# Patient Record
Sex: FEMALE | Race: WHITE | NOT HISPANIC OR LATINO | Employment: FULL TIME | URBAN - METROPOLITAN AREA
[De-identification: names, ages, dates, MRNs, and addresses within clinical notes are randomized per-mention and may not be internally consistent; named-entity substitution may affect disease eponyms.]

---

## 2021-03-17 ENCOUNTER — HOSPITAL ENCOUNTER (EMERGENCY)
Facility: HOSPITAL | Age: 36
Discharge: HOME | End: 2021-03-17
Attending: EMERGENCY MEDICINE | Admitting: EMERGENCY MEDICINE
Payer: COMMERCIAL

## 2021-03-17 ENCOUNTER — APPOINTMENT (EMERGENCY)
Dept: RADIOLOGY | Facility: HOSPITAL | Age: 36
End: 2021-03-17
Payer: COMMERCIAL

## 2021-03-17 VITALS
SYSTOLIC BLOOD PRESSURE: 190 MMHG | BODY MASS INDEX: 44.41 KG/M2 | WEIGHT: 293 LBS | TEMPERATURE: 97.9 F | RESPIRATION RATE: 15 BRPM | OXYGEN SATURATION: 95 % | HEART RATE: 98 BPM | HEIGHT: 68 IN | DIASTOLIC BLOOD PRESSURE: 90 MMHG

## 2021-03-17 DIAGNOSIS — M54.2 NECK PAIN: Primary | ICD-10-CM

## 2021-03-17 PROCEDURE — 99283 EMERGENCY DEPT VISIT LOW MDM: CPT

## 2021-03-17 PROCEDURE — 99284 EMERGENCY DEPT VISIT MOD MDM: CPT | Performed by: EMERGENCY MEDICINE

## 2021-03-17 PROCEDURE — 72050 X-RAY EXAM NECK SPINE 4/5VWS: CPT

## 2021-03-17 RX ORDER — CYCLOBENZAPRINE HCL 10 MG
10 TABLET ORAL ONCE
Status: COMPLETED | OUTPATIENT
Start: 2021-03-17 | End: 2021-03-17

## 2021-03-17 RX ORDER — CYCLOBENZAPRINE HCL 10 MG
10 TABLET ORAL 2 TIMES DAILY PRN
Qty: 8 TABLET | Refills: 0 | Status: SHIPPED | OUTPATIENT
Start: 2021-03-17

## 2021-03-17 RX ADMIN — CYCLOBENZAPRINE HYDROCHLORIDE 10 MG: 10 TABLET, FILM COATED ORAL at 18:46

## 2021-03-17 NOTE — ED PROVIDER NOTES
History  Chief Complaint   Patient presents with    Neck Pain     pt reports she was moving a heavy object when she suddenly felt a "pop" in her neck and is now having pain  This is a 65-year-old female who complains of neck pain over the C4 vertebrae  She sees a a is sore recently but significantly worsened at approximately 1:00 a m  today when making in abnormal jerking motion because of her dog  Pain is not radiating  Denies any numbness weakness or change in bowel or bladder habits  Denies any tuberculosis, cancer IV drug use  No unexplained night sweats or steroid use  Rates the pain as severe, worse with movement  Nothing is making it better  None       History reviewed  No pertinent past medical history  History reviewed  No pertinent surgical history  History reviewed  No pertinent family history  I have reviewed and agree with the history as documented  E-Cigarette/Vaping    E-Cigarette Use Never User      E-Cigarette/Vaping Substances     Social History     Tobacco Use    Smoking status: Never Smoker    Smokeless tobacco: Never Used   Substance Use Topics    Alcohol use: Never     Frequency: Never    Drug use: Never       Review of Systems   Constitutional: Negative for activity change, chills, fatigue and fever  HENT: Negative for congestion  Eyes: Negative for visual disturbance  Respiratory: Negative for cough, chest tightness and shortness of breath  Cardiovascular: Negative for chest pain  Gastrointestinal: Negative for abdominal pain, diarrhea and vomiting  Genitourinary: Negative for dysuria  Skin: Negative for rash  Neurological: Negative for dizziness, weakness and numbness  Physical Exam  Physical Exam  Constitutional:       Appearance: She is well-developed  Comments: Appears uncomfortable   HENT:      Head: Normocephalic and atraumatic        Right Ear: External ear normal       Left Ear: External ear normal    Eyes: Conjunctiva/sclera: Conjunctivae normal       Pupils: Pupils are equal, round, and reactive to light  Neck:      Musculoskeletal: Normal range of motion and neck supple  Cardiovascular:      Rate and Rhythm: Normal rate and regular rhythm  Heart sounds: Normal heart sounds  Pulmonary:      Effort: Pulmonary effort is normal  No respiratory distress  Breath sounds: Normal breath sounds  Abdominal:      General: Bowel sounds are normal  There is no distension  Palpations: Abdomen is soft  Tenderness: There is no abdominal tenderness  There is no guarding or rebound  Musculoskeletal: Normal range of motion  Skin:     General: Skin is warm and dry  Capillary Refill: Capillary refill takes less than 2 seconds  Neurological:      General: No focal deficit present  Mental Status: She is alert and oriented to person, place, and time  Cranial Nerves: No cranial nerve deficit  Sensory: No sensory deficit  Motor: No weakness        Coordination: Coordination normal       Gait: Gait normal    Psychiatric:         Behavior: Behavior normal          Vital Signs  ED Triage Vitals [03/17/21 1756]   Temperature Pulse Respirations Blood Pressure SpO2   97 9 °F (36 6 °C) 98 15 (!) 190/90 95 %      Temp Source Heart Rate Source Patient Position - Orthostatic VS BP Location FiO2 (%)   Temporal Monitor -- Right arm --      Pain Score       8           Vitals:    03/17/21 1756   BP: (!) 190/90   Pulse: 98         Visual Acuity      ED Medications  Medications   cyclobenzaprine (FLEXERIL) tablet 10 mg (10 mg Oral Given 3/17/21 1846)       Diagnostic Studies  Results Reviewed     None                 XR spine cervical complete 4 or 5 vw non injury   ED Interpretation by Paul Anne MD (03/17 1840)   naf                 Procedures  Procedures         ED Course                                           MDM  Number of Diagnoses or Management Options  Neck pain: new and requires workup  Diagnosis management comments: This is a 82-year-old female neck pain  Reassured by lack of neurological non abnormalities  Patient is tuna fish negative for warning signs  Patient started on Flexeril in the emergency department given a script to go home with  Discussed warning signs and symptoms with the patient as well as when to return to the emergency department versus follow up with [facility]  Patient states understanding and agreement with the plan  This note was completed using dictation software  Amount and/or Complexity of Data Reviewed  Tests in the radiology section of CPT®: ordered and reviewed  Independent visualization of images, tracings, or specimens: yes        Disposition  Final diagnoses:   Neck pain     Time reflects when diagnosis was documented in both MDM as applicable and the Disposition within this note     Time User Action Codes Description Comment    3/17/2021  6:40 PM Emerita Keene Add [M54 2] Neck pain       ED Disposition     ED Disposition Condition Date/Time Comment    Discharge Stable Wed Mar 17, 2021  6:40 PM Sofi Demoor discharge to home/self care  Follow-up Information     Follow up With Specialties Details Why Contact Info    King Sharma MD Internal Medicine In 1 day  [address] [place] [address] [id number] [phone]            Patient's Medications   Discharge Prescriptions    CYCLOBENZAPRINE (FLEXERIL) 10 MG TABLET    Take 1 tablet (10 mg total) by mouth 2 (two) times a day as needed for muscle spasms       Start Date: 3/17/2021 End Date: --       Order Dose: 10 mg       Quantity: 8 tablet    Refills: 0     No discharge procedures on file      PDMP Review     None          ED Provider  Electronically Signed by           Brando Pantoja MD  03/17/21 7497

## 2021-03-17 NOTE — Clinical Note
Sofi Roach was seen and treated in our emergency department on 3/17/2021  Diagnosis:     Sofi    She may return on this date: 03/22/2021         If you have any questions or concerns, please don't hesitate to call        Bandar Chiang MD    ______________________________           _______________          _______________  Hospital Representative                              Date                                Time

## 2022-08-05 ENCOUNTER — APPOINTMENT (OUTPATIENT)
Dept: LAB | Facility: CLINIC | Age: 37
End: 2022-08-05
Payer: COMMERCIAL

## 2022-08-05 DIAGNOSIS — Z13.0 SCREENING FOR IRON DEFICIENCY ANEMIA: ICD-10-CM

## 2022-08-05 DIAGNOSIS — Z11.59 SCREENING FOR RUBELLA: ICD-10-CM

## 2022-08-05 DIAGNOSIS — Z13.29 THYROID DISORDER SCREEN: ICD-10-CM

## 2022-08-05 DIAGNOSIS — Z01.83 ENCOUNTER FOR BLOOD TYPING: ICD-10-CM

## 2022-08-05 DIAGNOSIS — E28.2 POLYCYSTIC OVARIES: ICD-10-CM

## 2022-08-05 DIAGNOSIS — Z11.59 NEED FOR HEPATITIS C SCREENING TEST: ICD-10-CM

## 2022-08-05 DIAGNOSIS — Z11.3 SCREENING FOR STDS (SEXUALLY TRANSMITTED DISEASES): ICD-10-CM

## 2022-08-05 DIAGNOSIS — Z31.41 FERTILITY TESTING: ICD-10-CM

## 2022-08-05 DIAGNOSIS — Z11.59 SCREENING FOR VIRAL DISEASE: ICD-10-CM

## 2022-08-05 DIAGNOSIS — Z11.59 NEED FOR HEPATITIS B SCREENING TEST: ICD-10-CM

## 2022-08-05 DIAGNOSIS — Z11.4 SCREENING FOR HIV (HUMAN IMMUNODEFICIENCY VIRUS): ICD-10-CM

## 2022-08-05 LAB
ABO GROUP BLD: NORMAL
ALBUMIN SERPL BCP-MCNC: 3.1 G/DL (ref 3.5–5)
ALP SERPL-CCNC: 93 U/L (ref 46–116)
ALT SERPL W P-5'-P-CCNC: 37 U/L (ref 12–78)
ANION GAP SERPL CALCULATED.3IONS-SCNC: 6 MMOL/L (ref 4–13)
AST SERPL W P-5'-P-CCNC: 22 U/L (ref 5–45)
BILIRUB SERPL-MCNC: 0.25 MG/DL (ref 0.2–1)
BLD GP AB SCN SERPL QL: NEGATIVE
BUN SERPL-MCNC: 12 MG/DL (ref 5–25)
CALCIUM ALBUM COR SERPL-MCNC: 10.9 MG/DL (ref 8.3–10.1)
CALCIUM SERPL-MCNC: 10.2 MG/DL (ref 8.3–10.1)
CHLORIDE SERPL-SCNC: 105 MMOL/L (ref 96–108)
CHOLEST SERPL-MCNC: 213 MG/DL
CO2 SERPL-SCNC: 26 MMOL/L (ref 21–32)
CREAT SERPL-MCNC: 0.69 MG/DL (ref 0.6–1.3)
ERYTHROCYTE [DISTWIDTH] IN BLOOD BY AUTOMATED COUNT: 13.2 % (ref 11.6–15.1)
EST. AVERAGE GLUCOSE BLD GHB EST-MCNC: 120 MG/DL
GFR SERPL CREATININE-BSD FRML MDRD: 113 ML/MIN/1.73SQ M
GLUCOSE P FAST SERPL-MCNC: 107 MG/DL (ref 65–99)
HBA1C MFR BLD: 5.8 %
HBV SURFACE AG SER QL: NORMAL
HCT VFR BLD AUTO: 36.1 % (ref 34.8–46.1)
HDLC SERPL-MCNC: 39 MG/DL
HGB BLD-MCNC: 11.5 G/DL (ref 11.5–15.4)
INSULIN SERPL-ACNC: 27.2 MU/L (ref 3–25)
LDLC SERPL CALC-MCNC: 123 MG/DL (ref 0–100)
MCH RBC QN AUTO: 27.2 PG (ref 26.8–34.3)
MCHC RBC AUTO-ENTMCNC: 31.9 G/DL (ref 31.4–37.4)
MCV RBC AUTO: 85 FL (ref 82–98)
NONHDLC SERPL-MCNC: 174 MG/DL
PLATELET # BLD AUTO: 292 THOUSANDS/UL (ref 149–390)
PMV BLD AUTO: 10.1 FL (ref 8.9–12.7)
POTASSIUM SERPL-SCNC: 4.2 MMOL/L (ref 3.5–5.3)
PROT SERPL-MCNC: 7.4 G/DL (ref 6.4–8.4)
RBC # BLD AUTO: 4.23 MILLION/UL (ref 3.81–5.12)
RH BLD: POSITIVE
RUBV IGG SERPL IA-ACNC: 37 IU/ML
SODIUM SERPL-SCNC: 137 MMOL/L (ref 135–147)
TRIGL SERPL-MCNC: 256 MG/DL
TSH SERPL DL<=0.05 MIU/L-ACNC: 3.45 UIU/ML (ref 0.45–4.5)
WBC # BLD AUTO: 11.83 THOUSAND/UL (ref 4.31–10.16)

## 2022-08-05 PROCEDURE — 82627 DEHYDROEPIANDROSTERONE: CPT

## 2022-08-05 PROCEDURE — 83525 ASSAY OF INSULIN: CPT

## 2022-08-05 PROCEDURE — 86762 RUBELLA ANTIBODY: CPT

## 2022-08-05 PROCEDURE — 86901 BLOOD TYPING SEROLOGIC RH(D): CPT

## 2022-08-05 PROCEDURE — 84402 ASSAY OF FREE TESTOSTERONE: CPT

## 2022-08-05 PROCEDURE — 85027 COMPLETE CBC AUTOMATED: CPT

## 2022-08-05 PROCEDURE — 36415 COLL VENOUS BLD VENIPUNCTURE: CPT

## 2022-08-05 PROCEDURE — 80053 COMPREHEN METABOLIC PANEL: CPT

## 2022-08-05 PROCEDURE — 83498 ASY HYDROXYPROGESTERONE 17-D: CPT

## 2022-08-05 PROCEDURE — 86787 VARICELLA-ZOSTER ANTIBODY: CPT

## 2022-08-05 PROCEDURE — 86803 HEPATITIS C AB TEST: CPT

## 2022-08-05 PROCEDURE — 80061 LIPID PANEL: CPT

## 2022-08-05 PROCEDURE — 86592 SYPHILIS TEST NON-TREP QUAL: CPT

## 2022-08-05 PROCEDURE — 87389 HIV-1 AG W/HIV-1&-2 AB AG IA: CPT

## 2022-08-05 PROCEDURE — 82397 CHEMILUMINESCENT ASSAY: CPT

## 2022-08-05 PROCEDURE — 87491 CHLMYD TRACH DNA AMP PROBE: CPT

## 2022-08-05 PROCEDURE — 83036 HEMOGLOBIN GLYCOSYLATED A1C: CPT

## 2022-08-05 PROCEDURE — 87591 N.GONORRHOEAE DNA AMP PROB: CPT

## 2022-08-05 PROCEDURE — 86850 RBC ANTIBODY SCREEN: CPT

## 2022-08-05 PROCEDURE — 86900 BLOOD TYPING SEROLOGIC ABO: CPT

## 2022-08-05 PROCEDURE — 84443 ASSAY THYROID STIM HORMONE: CPT

## 2022-08-05 PROCEDURE — 84403 ASSAY OF TOTAL TESTOSTERONE: CPT

## 2022-08-05 PROCEDURE — 87340 HEPATITIS B SURFACE AG IA: CPT

## 2022-08-06 LAB
C TRACH DNA SPEC QL NAA+PROBE: NEGATIVE
DHEA-S SERPL-MCNC: 162 UG/DL (ref 84.8–378)
HCV AB S/CO SERPL IA: <0.1 S/CO RATIO (ref 0–0.9)
HIV 1+2 AB+HIV1 P24 AG SERPL QL IA: NORMAL
N GONORRHOEA DNA SPEC QL NAA+PROBE: NEGATIVE
RPR SER QL: NORMAL
SL AMB INTERPRETATION: NORMAL
TESTOST FREE SERPL-MCNC: 2.9 PG/ML (ref 0–4.2)
TESTOST SERPL-MCNC: 37 NG/DL (ref 8–60)
VZV IGG SER QL IA: NORMAL

## 2022-08-08 LAB — 17OHP SERPL-MCNC: 120 NG/DL

## 2022-08-11 LAB — MIS SERPL-MCNC: 3.57 NG/ML

## 2023-11-01 ENCOUNTER — OFFICE VISIT (OUTPATIENT)
Dept: URGENT CARE | Facility: CLINIC | Age: 38
End: 2023-11-01
Payer: COMMERCIAL

## 2023-11-01 ENCOUNTER — HOSPITAL ENCOUNTER (EMERGENCY)
Facility: HOSPITAL | Age: 38
Discharge: HOME | End: 2023-11-01
Attending: EMERGENCY MEDICINE
Payer: COMMERCIAL

## 2023-11-01 VITALS
HEART RATE: 66 BPM | DIASTOLIC BLOOD PRESSURE: 90 MMHG | OXYGEN SATURATION: 99 % | SYSTOLIC BLOOD PRESSURE: 178 MMHG | RESPIRATION RATE: 20 BRPM | TEMPERATURE: 98.1 F

## 2023-11-01 VITALS
SYSTOLIC BLOOD PRESSURE: 161 MMHG | RESPIRATION RATE: 18 BRPM | HEART RATE: 71 BPM | DIASTOLIC BLOOD PRESSURE: 87 MMHG | TEMPERATURE: 97.9 F | OXYGEN SATURATION: 99 %

## 2023-11-01 DIAGNOSIS — Z77.098 CHEMICAL EXPOSURE: Primary | ICD-10-CM

## 2023-11-01 DIAGNOSIS — Z77.098 EXPOSURE TO TOXIC CHEMICAL: Primary | ICD-10-CM

## 2023-11-01 DIAGNOSIS — L24.9 IRRITANT DERMATITIS: ICD-10-CM

## 2023-11-01 PROCEDURE — 99213 OFFICE O/P EST LOW 20 MIN: CPT | Performed by: NURSE PRACTITIONER

## 2023-11-01 PROCEDURE — 99282 EMERGENCY DEPT VISIT SF MDM: CPT

## 2023-11-01 PROCEDURE — 99284 EMERGENCY DEPT VISIT MOD MDM: CPT | Performed by: EMERGENCY MEDICINE

## 2023-11-01 PROCEDURE — S9088 SERVICES PROVIDED IN URGENT: HCPCS | Performed by: NURSE PRACTITIONER

## 2023-11-01 RX ORDER — LABETALOL 200 MG/1
200 TABLET, FILM COATED ORAL 3 TIMES DAILY
COMMUNITY
Start: 2023-09-04

## 2023-11-01 RX ORDER — TETRACAINE HYDROCHLORIDE 5 MG/ML
1 SOLUTION OPHTHALMIC ONCE
Status: COMPLETED | OUTPATIENT
Start: 2023-11-01 | End: 2023-11-01

## 2023-11-01 RX ORDER — CITALOPRAM HYDROBROMIDE 10 MG/1
10 TABLET ORAL DAILY
COMMUNITY
Start: 2023-10-19

## 2023-11-01 RX ADMIN — TETRACAINE HYDROCHLORIDE 1 DROP: 5 SOLUTION OPHTHALMIC at 16:42

## 2023-11-01 RX ADMIN — FLUORESCEIN SODIUM 1 STRIP: 1 STRIP OPHTHALMIC at 16:42
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
## 2024-03-07 ENCOUNTER — OFFICE VISIT (OUTPATIENT)
Dept: URGENT CARE | Facility: CLINIC | Age: 39
End: 2024-03-07
Payer: COMMERCIAL

## 2024-03-07 VITALS
DIASTOLIC BLOOD PRESSURE: 83 MMHG | SYSTOLIC BLOOD PRESSURE: 135 MMHG | TEMPERATURE: 97.9 F | HEART RATE: 83 BPM | RESPIRATION RATE: 20 BRPM | OXYGEN SATURATION: 93 %

## 2024-03-07 DIAGNOSIS — H92.01 ACUTE OTALGIA, RIGHT: ICD-10-CM

## 2024-03-07 DIAGNOSIS — J02.9 SORE THROAT: Primary | ICD-10-CM

## 2024-03-07 DIAGNOSIS — J06.9 VIRAL UPPER RESPIRATORY TRACT INFECTION WITH COUGH: ICD-10-CM

## 2024-03-07 LAB — S PYO AG THROAT QL: NEGATIVE

## 2024-03-07 PROCEDURE — 99214 OFFICE O/P EST MOD 30 MIN: CPT | Performed by: NURSE PRACTITIONER

## 2024-03-07 PROCEDURE — 87070 CULTURE OTHR SPECIMN AEROBIC: CPT | Performed by: NURSE PRACTITIONER

## 2024-03-07 PROCEDURE — S9088 SERVICES PROVIDED IN URGENT: HCPCS | Performed by: NURSE PRACTITIONER

## 2024-03-07 PROCEDURE — 87880 STREP A ASSAY W/OPTIC: CPT | Performed by: NURSE PRACTITIONER

## 2024-03-07 RX ORDER — FEXOFENADINE HCL 180 MG/1
180 TABLET ORAL
COMMUNITY
Start: 2024-02-21

## 2024-03-07 RX ORDER — MOMETASONE FUROATE 1 MG/G
OINTMENT TOPICAL DAILY
COMMUNITY
Start: 2024-02-21

## 2024-03-07 RX ORDER — BENZONATATE 100 MG/1
100 CAPSULE ORAL
COMMUNITY
Start: 2024-03-05

## 2024-03-07 RX ORDER — LOSARTAN POTASSIUM 25 MG/1
25 TABLET ORAL DAILY
COMMUNITY
Start: 2024-01-30 | End: 2024-07-28

## 2024-03-07 RX ORDER — PREDNISONE 10 MG/1
TABLET ORAL
Qty: 24 TABLET | Refills: 0 | Status: SHIPPED | OUTPATIENT
Start: 2024-03-07

## 2024-03-07 NOTE — PROGRESS NOTES
[facility]        NAME: Sofi Roach is a 38 y.o. female  : 1985    MRN: [id number]  DATE: 2024  TIME: 1:26 PM    Assessment and Plan   Sore throat [J02.9]  1. Sore throat  POCT rapid strepA    predniSONE 10 mg tablet    Throat culture      2. Viral upper respiratory tract infection with cough  predniSONE 10 mg tablet      3. Acute otalgia, right  predniSONE 10 mg tablet            Patient Instructions       Follow up with PCP in 3-5 days.  Proceed to  ER if symptoms worsen.    If tests have been performed at [facility], our office will contact you with results if changes need to be made to the care plan discussed with you at the visit.  You can review your full results on [facility].      Your strep A is negative. You have a throat culture pending. You are to download [facility] mychart for the results in 3-4 days.  You will be notified if the results are + and an antibiotic will be called in for you.    You are to do warm salt water gargles 4 x daily.  Drink warm tea with honey and lemon.  Take tylenol or motrin as able for pain or fever.  Chloraseptic throat spray, cough drops.  Do not share utensils.  Change your tooth brush in 3 days.  Follow up with your PCP in 2-3 days  Go to the ED if symptoms worsen      You may continue your OTC medications.  You have been prescribed prednisone for inflammation.  You have been prescribed prednisone. Take with food. Do NOT take any NSAID products (motrin, ibuprofen, aleve, advil) while taking this medication.  You may take tylenol.         Chief Complaint     Chief Complaint   Patient presents with    Earache    Sore Throat         History of Present Illness       This is a 38 year old female who states that last week she had a temp of 103, cough, sorethroat that is worsening.  She states she has been taking OTC w/o relief.  She c/o right ear pain.  She did a tele health visit 2 days ago and was prescribed benzonate for her cough.  She states she  forgot to tell them about her sorethroat.   She denies pregnancy.  PMH is listed     Earache   Associated symptoms include coughing and a sore throat.   Sore Throat   Associated symptoms include coughing and ear pain.       Review of Systems   Review of Systems   Constitutional:  Positive for fever.   HENT:  Positive for ear pain and sore throat.    Eyes: Negative.    Respiratory:  Positive for cough.    Cardiovascular: Negative.    Gastrointestinal: Negative.    Endocrine: Negative.    Genitourinary: Negative.    Musculoskeletal: Negative.    Skin: Negative.    Allergic/Immunologic: Negative.    Neurological: Negative.    Hematological: Negative.    Psychiatric/Behavioral: Negative.           Current Medications       Current Outpatient Medications:     benzonatate (TESSALON PERLES) 100 mg capsule, Take 100 mg by mouth, Disp: , Rfl:     citalopram (CeleXA) 10 mg tablet, Take 10 mg by mouth daily, Disp: , Rfl:     fexofenadine (ALLEGRA) 180 MG tablet, Take 180 mg by mouth, Disp: , Rfl:     labetalol (NORMODYNE) 200 mg tablet, Take 200 mg by mouth Three times a day, Disp: , Rfl:     losartan (COZAAR) 25 mg tablet, Take 25 mg by mouth daily, Disp: , Rfl:     metFORMIN (GLUCOPHAGE) 500 mg tablet, Take 500 mg by mouth 2 (two) times a day, Disp: , Rfl:     mometasone (ELOCON) 0.1 % ointment, Apply topically daily, Disp: , Rfl:     predniSONE 10 mg tablet, Take 5 tabs po x 2 days; 4 tabs po x 2 days; 3 tabs po x 1 day; 2 tabs po x 1 day. 1 tab po x 1 day., Disp: 24 tablet, Rfl: 0    cyclobenzaprine (FLEXERIL) 10 mg tablet, Take 1 tablet (10 mg total) by mouth 2 (two) times a day as needed for muscle spasms (Patient not taking: Reported on 11/1/2023), Disp: 8 tablet, Rfl: 0    Ferrous Sulfate (IRON PO), Take 1 tablet by mouth daily (Patient not taking: Reported on 3/7/2024), Disp: , Rfl:     Current Allergies     Allergies as of 03/07/2024 - Reviewed 03/07/2024   Allergen Reaction Noted    Cefprozil Rash 08/05/1998    Food  Anaphylaxis 2011    Sumatriptan Other (See Comments) 10/12/2005    Guaifenesin Hives 2020            The following portions of the patient's history were reviewed and updated as appropriate: allergies, current medications, past family history, past medical history, past social history, past surgical history and problem list.     Past Medical History:   Diagnosis Date    Depression     Hypertension        Past Surgical History:   Procedure Laterality Date     SECTION      ORIF FIBULA FRACTURE         History reviewed. No pertinent family history.      Medications have been verified.        Objective   /83   Pulse 83   Temp 97.9 °F (36.6 °C)   Resp 20   LMP 2024 (Exact Date)   SpO2 93%   Patient's last menstrual period was 2024 (exact date).       Physical Exam     Physical Exam  Vitals and nursing note reviewed.   Constitutional:       General: She is not in acute distress.     Appearance: She is well-developed. She is obese. She is not toxic-appearing or diaphoretic.   HENT:      Head: Normocephalic and atraumatic.      Right Ear: Tympanic membrane and ear canal normal.      Left Ear: Tympanic membrane and ear canal normal.      Nose: No congestion or rhinorrhea.      Mouth/Throat:      Mouth: Mucous membranes are moist. No oral lesions.      Pharynx: Uvula midline. No pharyngeal swelling, oropharyngeal exudate, posterior oropharyngeal erythema or uvula swelling.      Tonsils: No tonsillar exudate or tonsillar abscesses.   Eyes:      Extraocular Movements:      Right eye: Normal extraocular motion.      Left eye: Normal extraocular motion.   Cardiovascular:      Rate and Rhythm: Normal rate and regular rhythm.      Heart sounds: Normal heart sounds. No murmur heard.  Pulmonary:      Effort: Pulmonary effort is normal. No respiratory distress.      Breath sounds: Normal breath sounds. No stridor. No wheezing, rhonchi or rales.   Chest:      Chest wall: No tenderness.    Lymphadenopathy:      Cervical: No cervical adenopathy.   Skin:     General: Skin is warm and dry.      Capillary Refill: Capillary refill takes less than 2 seconds.   Neurological:      General: No focal deficit present.      Mental Status: She is alert and oriented to person, place, and time.   Psychiatric:         Mood and Affect: Mood normal.         Behavior: Behavior normal.

## 2024-03-07 NOTE — PATIENT INSTRUCTIONS
Your strep A is negative. You have a throat culture pending. You are to download [employer] for the results in 3-4 days.  You will be notified if the results are + and an antibiotic will be called in for you.    You are to do warm salt water gargles 4 x daily.  Drink warm tea with honey and lemon.  Take tylenol or motrin as able for pain or fever.  Chloraseptic throat spray, cough drops.  Do not share utensils.  Change your tooth brush in 3 days.  Follow up with your PCP in 2-3 days  Go to the ED if symptoms worsen      You may continue your OTC medications.  You have been prescribed prednisone for inflammation.  You have been prescribed prednisone. Take with food. Do NOT take any NSAID products (motrin, ibuprofen, aleve, advil) while taking this medication.  You may take tylenol.

## 2024-03-10 LAB — BACTERIA THROAT CULT: NORMAL

## 2025-02-24 ENCOUNTER — APPOINTMENT (EMERGENCY)
Dept: RADIOLOGY | Facility: HOSPITAL | Age: 40
End: 2025-02-24
Payer: COMMERCIAL

## 2025-02-24 ENCOUNTER — HOSPITAL ENCOUNTER (EMERGENCY)
Facility: HOSPITAL | Age: 40
Discharge: HOME | End: 2025-02-24
Attending: EMERGENCY MEDICINE | Admitting: EMERGENCY MEDICINE
Payer: COMMERCIAL

## 2025-02-24 VITALS
DIASTOLIC BLOOD PRESSURE: 90 MMHG | HEART RATE: 70 BPM | TEMPERATURE: 98 F | BODY MASS INDEX: 44.41 KG/M2 | RESPIRATION RATE: 16 BRPM | WEIGHT: 293 LBS | OXYGEN SATURATION: 98 % | SYSTOLIC BLOOD PRESSURE: 186 MMHG | HEIGHT: 68 IN

## 2025-02-24 DIAGNOSIS — R05.9 COUGH: Primary | ICD-10-CM

## 2025-02-24 DIAGNOSIS — R07.89 RIGHT-SIDED CHEST WALL PAIN: ICD-10-CM

## 2025-02-24 PROCEDURE — 99283 EMERGENCY DEPT VISIT LOW MDM: CPT

## 2025-02-24 PROCEDURE — 71046 X-RAY EXAM CHEST 2 VIEWS: CPT

## 2025-02-24 PROCEDURE — 99284 EMERGENCY DEPT VISIT MOD MDM: CPT | Performed by: EMERGENCY MEDICINE

## 2025-02-24 RX ORDER — OXYCODONE HYDROCHLORIDE 5 MG/1
5 TABLET ORAL EVERY 6 HOURS PRN
Qty: 12 TABLET | Refills: 0 | Status: SHIPPED | OUTPATIENT
Start: 2025-02-24 | End: 2025-03-06

## 2025-02-24 RX ORDER — PROMETHAZINE HYDROCHLORIDE 12.5 MG/1
25 TABLET ORAL EVERY 6 HOURS PRN
Qty: 24 TABLET | Refills: 0 | Status: SHIPPED | OUTPATIENT
Start: 2025-02-24

## 2025-02-24 RX ORDER — ALBUTEROL SULFATE 90 UG/1
2 INHALANT RESPIRATORY (INHALATION) EVERY 6 HOURS PRN
Qty: 8.5 G | Refills: 1 | Status: SHIPPED | OUTPATIENT
Start: 2025-02-24

## 2025-02-24 RX ORDER — DOXYCYCLINE 100 MG/1
100 CAPSULE ORAL 2 TIMES DAILY
COMMUNITY
Start: 2024-12-22

## 2025-02-24 RX ORDER — ALBUTEROL SULFATE 90 UG/1
2 INHALANT RESPIRATORY (INHALATION) EVERY 6 HOURS PRN
COMMUNITY
End: 2025-02-24

## 2025-02-24 RX ORDER — CITALOPRAM HYDROBROMIDE 20 MG/1
20 TABLET ORAL EVERY MORNING
COMMUNITY
Start: 2024-12-09

## 2025-02-24 RX ORDER — IVERMECTIN 10 MG/G
CREAM TOPICAL
COMMUNITY
Start: 2024-12-12

## 2025-02-24 RX ORDER — TRIAMCINOLONE ACETONIDE 1 MG/G
CREAM TOPICAL
COMMUNITY
Start: 2024-12-22

## 2025-02-24 NOTE — ED PROVIDER NOTES
Time reflects when diagnosis was documented in both MDM as applicable and the Disposition within this note       Time User Action Codes Description Comment    2/24/2025  2:01 PM Warner Daly Add [R05.9] Cough     2/24/2025  2:01 PM Warner Daly Add [R07.89] Right-sided chest wall pain           ED Disposition       ED Disposition   Discharge    Condition   Stable    Date/Time   Mon Feb 24, 2025  2:01 PM    Comment   Sofi Demoor discharge to home/self care.                   Assessment & Plan       Medical Decision Making  Diff includes viral syndrome, asthma, pneumonia, ptx, chest wall strain, rib fx from coughing    Amount and/or Complexity of Data Reviewed  Radiology: ordered and independent interpretation performed.    Risk  Prescription drug management.             Medications - No data to display    ED Risk Strat Scores                            SBIRT 22yo+      Flowsheet Row Most Recent Value   Initial Alcohol Screen: US AUDIT-C     1. How often do you have a drink containing alcohol? 0 Filed at: 02/24/2025 1305   2. How many drinks containing alcohol do you have on a typical day you are drinking?  0 Filed at: 02/24/2025 1305   3a. Male UNDER 65: How often do you have five or more drinks on one occasion? 0 Filed at: 02/24/2025 1305   3b. FEMALE Any Age, or MALE 65+: How often do you have 4 or more drinks on one occassion? 0 Filed at: 02/24/2025 1305   Audit-C Score 0 Filed at: 02/24/2025 1305   RUPINDER: How many times in the past year have you...    Used an illegal drug or used a prescription medication for non-medical reasons? Never Filed at: 02/24/2025 1305                            History of Present Illness       Chief Complaint   Patient presents with    Rib Pain     Right sided rib pain, pt reports she's been excessively coughing x3 weeks, after household members were flu +.  Pt reports excruciating rib pain started last night       Past Medical History:   Diagnosis Date    Asthma     Depression      Hypertension       Past Surgical History:   Procedure Laterality Date     SECTION      ORIF FIBULA FRACTURE      TONSILLECTOMY        History reviewed. No pertinent family history.   Social History     Tobacco Use    Smoking status: Never    Smokeless tobacco: Never   Vaping Use    Vaping status: Never Used   Substance Use Topics    Alcohol use: Never    Drug use: Never      E-Cigarette/Vaping    E-Cigarette Use Never User       E-Cigarette/Vaping Substances      I have reviewed and agree with the history as documented.     Hx from patient, pmh asthma, chronic cough, c/o flu few weeks ago then persistent dry cough, tickle in throat since then.  No further fever.  Patient had right chest wall pain sudden while laying in bed 3 days ago and coughing.  It got worse last night when she coughed and felt a pop - pain is worse with movement, no relief with tessalon perles and aleve.  No sob.        Review of Systems   Constitutional:  Negative for chills and fever.   HENT:  Negative for rhinorrhea and sore throat.    Respiratory:  Positive for cough. Negative for chest tightness and shortness of breath.    Cardiovascular:  Positive for chest pain.   Gastrointestinal:  Negative for constipation, diarrhea, nausea and vomiting.   Genitourinary:  Negative for dysuria and frequency.   Skin:  Negative for rash.   All other systems reviewed and are negative.          Objective       ED Triage Vitals   Temperature Pulse Blood Pressure Respirations SpO2 Patient Position - Orthostatic VS   25 1301 25 1306 25 1306 25 1306 25 1306 --   (!) 97.2 °F (36.2 °C) 78 (!) 186/106 20 97 %       Temp Source Heart Rate Source BP Location FiO2 (%) Pain Score    25 1301 -- -- -- 25 1301    Temporal    10 - Worst Possible Pain      Vitals      Date and Time Temp Pulse SpO2 Resp BP Pain Score FACES Pain Rating User   25 1306 -- 78 97 % 20 186/106 -- -- FREDA   25 1301 97.2 °F (36.2 °C) -- --  -- -- 10 - Worst Possible Pain -- FREDA            Physical Exam  Vitals and nursing note reviewed.   Constitutional:       Appearance: She is well-developed.   HENT:      Head: Normocephalic and atraumatic.      Right Ear: External ear normal.      Left Ear: External ear normal.      Nose: Nose normal.      Mouth/Throat:      Mouth: Mucous membranes are moist.      Pharynx: Oropharynx is clear.   Eyes:      Conjunctiva/sclera: Conjunctivae normal.      Pupils: Pupils are equal, round, and reactive to light.   Cardiovascular:      Rate and Rhythm: Normal rate and regular rhythm.      Heart sounds: Normal heart sounds.   Pulmonary:      Effort: Pulmonary effort is normal. No respiratory distress.      Breath sounds: Normal breath sounds. No wheezing.   Chest:      Chest wall: Tenderness present. No deformity or crepitus.       Abdominal:      General: Bowel sounds are normal. There is no distension.      Palpations: Abdomen is soft.      Tenderness: There is no abdominal tenderness.   Musculoskeletal:         General: No deformity. Normal range of motion.      Cervical back: Normal range of motion and neck supple. No spinous process tenderness.   Skin:     General: Skin is warm and dry.      Findings: No rash.   Neurological:      General: No focal deficit present.      Mental Status: She is alert.      GCS: GCS eye subscore is 4. GCS verbal subscore is 5. GCS motor subscore is 6.      Sensory: No sensory deficit.   Psychiatric:         Mood and Affect: Mood normal.         Results Reviewed       None            XR chest 2 views   ED Interpretation by Warner Daly DO (02/24 [id number])   No acute abnl, no ptx, effusion, infiltrate, no obvious rib fracture, no widened mediastinum.  Interpreted by me              Final Interpretation by Robby Grewal MD (02/24 0368)      No acute cardiopulmonary disease.            Workstation performed: [id number]             Procedures    ED Medication and Procedure Management   Prior  to Admission Medications   Prescriptions Last Dose Informant Patient Reported? Taking?   Ivermectin 1 % CREA   Yes Yes   Sig: Apply to affected area of face once daily in the morning after cleansing   albuterol (PROVENTIL HFA,VENTOLIN HFA) 90 mcg/act inhaler   Yes Yes   Sig: Inhale 2 puffs every 6 (six) hours as needed for wheezing   benzonatate (TESSALON PERLES) 100 mg capsule 2/24/2025  Yes Yes   Sig: Take 100 mg by mouth   citalopram (CeleXA) 20 mg tablet   Yes Yes   Sig: Take 20 mg by mouth every morning   doxycycline monohydrate (MONODOX) 100 mg capsule   Yes Yes   Sig: Take 100 mg by mouth 2 (two) times a day   labetalol (NORMODYNE) 200 mg tablet 2/23/2025  Yes Yes   Sig: Take 200 mg by mouth Three times a day   losartan (COZAAR) 25 mg tablet 2/23/2025  Yes Yes   Sig: Take 25 mg by mouth daily   metFORMIN (GLUCOPHAGE) 500 mg tablet 2/23/2025  Yes Yes   Sig: Take 500 mg by mouth 2 (two) times a day   mometasone (ELOCON) 0.1 % ointment 2/23/2025  Yes Yes   Sig: Apply topically daily   triamcinolone (KENALOG) 0.1 % cream   Yes Yes   Sig: apply A THIN LAYER TO AFFECTED AREA 2-3 TIMES PER DAY      Facility-Administered Medications: None     Patient's Medications   Discharge Prescriptions    OXYCODONE (ROXICODONE) 5 IMMEDIATE RELEASE TABLET    Take 1 tablet (5 mg total) by mouth every 6 (six) hours as needed for severe pain for up to 10 days Max Daily Amount: 20 mg       Start Date: 2/24/2025 End Date: 3/6/2025       Order Dose: 5 mg       Quantity: 12 tablet    Refills: 0    PROMETHAZINE (PHENERGAN) 12.5 MG TABLET    Take 2 tablets (25 mg total) by mouth every 6 (six) hours as needed (persistent cough)       Start Date: 2/24/2025 End Date: --       Order Dose: 25 mg       Quantity: 24 tablet    Refills: 0     No discharge procedures on file.  ED SEPSIS DOCUMENTATION   Time reflects when diagnosis was documented in both MDM as applicable and the Disposition within this note       Time User Action Codes Description  Comment    2/24/2025  2:01 PM Warner Daly [R05.9] Cough     2/24/2025  2:01 PM Warner Daly [R07.89] Right-sided chest wall pain                  Warner Daly DO  02/24/25 [id number]